# Patient Record
Sex: MALE | Race: WHITE | NOT HISPANIC OR LATINO | Employment: STUDENT | URBAN - METROPOLITAN AREA
[De-identification: names, ages, dates, MRNs, and addresses within clinical notes are randomized per-mention and may not be internally consistent; named-entity substitution may affect disease eponyms.]

---
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
## 2024-01-15 ENCOUNTER — PATIENT MESSAGE (OUTPATIENT)
Dept: PRIMARY CARE CLINIC | Facility: CLINIC | Age: 19
End: 2024-01-15
Payer: COMMERCIAL

## 2024-01-15 DIAGNOSIS — F90.2 ATTENTION DEFICIT HYPERACTIVITY DISORDER (ADHD), COMBINED TYPE: Primary | ICD-10-CM

## 2024-01-17 RX ORDER — DEXTROAMPHETAMINE SACCHARATE, AMPHETAMINE ASPARTATE MONOHYDRATE, DEXTROAMPHETAMINE SULFATE AND AMPHETAMINE SULFATE 5; 5; 5; 5 MG/1; MG/1; MG/1; MG/1
20 CAPSULE, EXTENDED RELEASE ORAL EVERY MORNING
Qty: 30 CAPSULE | Refills: 0 | Status: SHIPPED | OUTPATIENT
Start: 2024-01-17 | End: 2024-03-07 | Stop reason: SDUPTHER

## 2024-03-07 ENCOUNTER — TELEPHONE (OUTPATIENT)
Dept: PRIMARY CARE CLINIC | Facility: CLINIC | Age: 19
End: 2024-03-07
Payer: COMMERCIAL

## 2024-03-07 DIAGNOSIS — F90.2 ATTENTION DEFICIT HYPERACTIVITY DISORDER (ADHD), COMBINED TYPE: ICD-10-CM

## 2024-03-07 RX ORDER — DEXTROAMPHETAMINE SACCHARATE, AMPHETAMINE ASPARTATE MONOHYDRATE, DEXTROAMPHETAMINE SULFATE AND AMPHETAMINE SULFATE 5; 5; 5; 5 MG/1; MG/1; MG/1; MG/1
20 CAPSULE, EXTENDED RELEASE ORAL EVERY MORNING
Qty: 30 CAPSULE | Refills: 0 | Status: SHIPPED | OUTPATIENT
Start: 2024-03-07 | End: 2024-04-16 | Stop reason: SDUPTHER

## 2024-03-07 NOTE — TELEPHONE ENCOUNTER
----- Message from Molly Love LPN sent at 3/7/2024 12:08 PM CST -----  Contact: Maegan/Mom    ----- Message -----  From: Sherry Velasquez MA  Sent: 3/4/2024   3:48 PM CST  To: Molly Love LPN      ----- Message -----  From: Reta Thomas  Sent: 3/4/2024   2:48 PM CST  To: [facility]    Type:  Sooner Apoointment Request    Caller is requesting a sooner appointment. Caller will not accept being placed on the waitlist and is requesting a message be sent to doctor.  Name of Caller:Maegan  When is the first available appointment?6/18/24  Symptoms:follow up/labs  Would the patient rather a call back or a response via [facility]? call  Best Call Back Number:[phone]   Additional Information: Patient's mom reports patient had issues with vehicle and request to reschedule for a sooner date than next available.   Thank you,  [facility]

## 2024-03-13 ENCOUNTER — OFFICE VISIT (OUTPATIENT)
Dept: PRIMARY CARE CLINIC | Facility: CLINIC | Age: 19
End: 2024-03-13
Payer: COMMERCIAL

## 2024-03-13 VITALS
HEART RATE: 91 BPM | OXYGEN SATURATION: 97 % | TEMPERATURE: 99 F | DIASTOLIC BLOOD PRESSURE: 81 MMHG | WEIGHT: 210.31 LBS | SYSTOLIC BLOOD PRESSURE: 129 MMHG | RESPIRATION RATE: 18 BRPM | BODY MASS INDEX: 30.11 KG/M2 | HEIGHT: 70 IN

## 2024-03-13 DIAGNOSIS — Z11.4 SCREENING FOR HIV (HUMAN IMMUNODEFICIENCY VIRUS): ICD-10-CM

## 2024-03-13 DIAGNOSIS — Z13.29 THYROID DISORDER SCREEN: ICD-10-CM

## 2024-03-13 DIAGNOSIS — R35.0 URINE FREQUENCY: ICD-10-CM

## 2024-03-13 DIAGNOSIS — Z13.220 SCREENING CHOLESTEROL LEVEL: ICD-10-CM

## 2024-03-13 DIAGNOSIS — T78.40XD ALLERGY, SUBSEQUENT ENCOUNTER: Chronic | ICD-10-CM

## 2024-03-13 DIAGNOSIS — Z11.59 NEED FOR HEPATITIS C SCREENING TEST: ICD-10-CM

## 2024-03-13 DIAGNOSIS — F90.2 ATTENTION DEFICIT HYPERACTIVITY DISORDER (ADHD), COMBINED TYPE: ICD-10-CM

## 2024-03-13 DIAGNOSIS — Z00.00 ANNUAL PHYSICAL EXAM: Primary | ICD-10-CM

## 2024-03-13 DIAGNOSIS — J30.89 SEASONAL ALLERGIC RHINITIS DUE TO OTHER ALLERGIC TRIGGER: ICD-10-CM

## 2024-03-13 DIAGNOSIS — J30.2 SEASONAL ALLERGIES: ICD-10-CM

## 2024-03-13 PROCEDURE — 3008F BODY MASS INDEX DOCD: CPT | Mod: CPTII,S$GLB,, | Performed by: NURSE PRACTITIONER

## 2024-03-13 PROCEDURE — 1159F MED LIST DOCD IN RCRD: CPT | Mod: CPTII,S$GLB,, | Performed by: NURSE PRACTITIONER

## 2024-03-13 PROCEDURE — 3074F SYST BP LT 130 MM HG: CPT | Mod: CPTII,S$GLB,, | Performed by: NURSE PRACTITIONER

## 2024-03-13 PROCEDURE — 1160F RVW MEDS BY RX/DR IN RCRD: CPT | Mod: CPTII,S$GLB,, | Performed by: NURSE PRACTITIONER

## 2024-03-13 PROCEDURE — 99395 PREV VISIT EST AGE 18-39: CPT | Mod: S$GLB,,, | Performed by: NURSE PRACTITIONER

## 2024-03-13 PROCEDURE — 3079F DIAST BP 80-89 MM HG: CPT | Mod: CPTII,S$GLB,, | Performed by: NURSE PRACTITIONER

## 2024-03-13 RX ORDER — FLUTICASONE PROPIONATE 50 MCG
2 SPRAY, SUSPENSION (ML) NASAL DAILY
Qty: 16 G | Refills: 2 | Status: SHIPPED | OUTPATIENT
Start: 2024-03-13

## 2024-03-13 RX ORDER — AZITHROMYCIN 250 MG/1
TABLET, FILM COATED ORAL
Qty: 6 TABLET | Refills: 0 | Status: SHIPPED | OUTPATIENT
Start: 2024-03-13 | End: 2024-03-18

## 2024-03-13 NOTE — PROGRESS NOTES
Subjective:       Patient ID: Tano Yin is a 18 y.o. male.    Chief Complaint: Annual Exam    17 y/o male in for annual visit. Mr. Yin is still in school at [employer] [facility] [place].    Feels well today with his only concern is his allergies have been flared up worse lately for the past 3 days and he has some sinus congestion and drainage. Denies fever, cough or body aches.    ADHD - controlled with Adderall 20 mg XR and it is working well for him but he feels the medication wears off early in the day. He is tolerating the med well, no s/e of CP, palpitations, syncope. LA  checked and consistent with pt hx.     Allergies - chronic issue, previously seen by Dr. Bright due to him having food allergies. 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 Followed by Dr. Bright and is prescribed xyzal and flonase and they work well for him. Denies any recent allergy reactions. Has an EPIPEN for emergency reactions.     Still uses a nicotine vape 2-3 times per week. Denies drinking alcohol.                  Past Medical History:   Diagnosis Date    ADD (attention deficit disorder)        Past Surgical History:   Procedure Laterality Date    ULNAR COLLATERAL LIGAMENT RECONSTRUCTION Right 04/12/2021    Dr. Ogden       History reviewed. No pertinent family history.    Social History     Tobacco Use    Smoking status: Never    Smokeless tobacco: Never   Substance Use Topics    Alcohol use: Never    Drug use: Never       Patient Active Problem List   Diagnosis    Traumatic rupture of right ulnar collateral ligament    Attention deficit hyperactivity disorder (ADHD), combined type    Allergies    Seasonal allergies       Immunization History   Administered Date(s) Administered    DTaP 05/27/2005, 08/25/2006, 11/05/2009    DTaP / Hep B / IPV 2004, 02/14/2005    Hepatitis A, Pediatric/Adolescent, 2 Dose 12/07/2020    Hepatitis B, Pediatric/Adolescent 08/25/2006    HiB PRP-OMP 2004, 02/14/2005, 08/13/2007    IPV 02/14/2005, 08/25/2006, 11/13/2008     Influenza (Flumist) - Quadrivalent - Intranasal *Preferred* (2-49 years old) 10/14/2014    Influenza - Intranasal - Trivalent 01/25/2013    Influenza - Quadrivalent - PF *Preferred* (6 months and older) 12/07/2020    Influenza - Trivalent - PF (ADULT) 11/04/2010    MMR 05/09/2006, 11/13/2008    Meningococcal B, recombinant 12/07/2020    Meningococcal Conjugate (MCV4P) 10/19/2017, 12/07/2020    Pneumococcal Conjugate - 7 Valent 02/14/2005, 05/27/2005, 08/25/2006, 08/13/2007    Tdap 08/17/2015    Varicella 08/25/2006, 11/13/2008           Review of Systems   Constitutional:  Negative for activity change, appetite change, chills, diaphoresis, fatigue and fever.   HENT:  Positive for congestion, postnasal drip, rhinorrhea, sinus pressure and sinus pain. Negative for ear discharge, ear pain, sneezing, sore throat, tinnitus and trouble swallowing.    Eyes:  Negative for pain, discharge, redness, itching and visual disturbance.   Respiratory:  Negative for cough, chest tightness, shortness of breath and wheezing.    Cardiovascular:  Negative for chest pain, palpitations and leg swelling.   Gastrointestinal:  Negative for abdominal distention, abdominal pain, blood in stool, constipation, diarrhea, nausea and vomiting.   Endocrine: Negative for cold intolerance, heat intolerance, polydipsia, polyphagia and polyuria.   Genitourinary:  Negative for difficulty urinating, dysuria, frequency, hematuria and urgency.   Musculoskeletal:  Negative for back pain, gait problem and neck pain.   Skin:  Negative for color change and rash.   Allergic/Immunologic: Positive for environmental allergies and food allergies.   Neurological:  Negative for dizziness, speech difficulty, weakness, light-headedness, numbness and headaches.   Hematological:  Negative for adenopathy. Does not bruise/bleed easily.   Psychiatric/Behavioral:  Negative for behavioral problems, confusion and dysphoric mood. The patient is not nervous/anxious.   "    Objective:     Vitals:    03/13/24 0921   BP: 129/81   BP Location: Right arm   Patient Position: Sitting   BP Method: Large (Automatic)   Pulse: 91   Resp: 18   Temp: 99.3 °F (37.4 °C)   TempSrc: Oral   SpO2: 97%   Weight: 95.4 kg (210 lb 4.8 oz)   Height: 5' 10" (1.778 m)       Physical Exam  Vitals and nursing note reviewed.   Constitutional:       General: He is not in acute distress.     Appearance: Normal appearance. He is not diaphoretic.   HENT:      Head: Normocephalic and atraumatic.      Nose: Congestion and rhinorrhea present.      Mouth/Throat:      Mouth: Mucous membranes are moist.      Pharynx: Oropharynx is clear. No oropharyngeal exudate or posterior oropharyngeal erythema.   Eyes:      General:         Right eye: No discharge.         Left eye: No discharge.      Extraocular Movements: Extraocular movements intact.      Conjunctiva/sclera: Conjunctivae normal.      Pupils: Pupils are equal, round, and reactive to light.   Neck:      Thyroid: No thyromegaly or thyroid tenderness.   Cardiovascular:      Rate and Rhythm: Normal rate and regular rhythm.      Pulses: Normal pulses.      Heart sounds: Normal heart sounds.   Pulmonary:      Effort: Pulmonary effort is normal. No tachypnea or bradypnea.      Breath sounds: Normal breath sounds. No stridor. No wheezing or rales.   Abdominal:      General: Bowel sounds are normal. There is no distension.      Palpations: Abdomen is soft.      Tenderness: There is no abdominal tenderness.   Musculoskeletal:         General: No tenderness. Normal range of motion.      Cervical back: Normal range of motion and neck supple.      Right lower leg: No edema.      Left lower leg: No edema.   Lymphadenopathy:      Cervical: No cervical adenopathy.   Skin:     General: Skin is warm and dry.      Capillary Refill: Capillary refill takes less than 2 seconds.      Findings: No rash.   Neurological:      General: No focal deficit present.      Mental Status: He is " alert and oriented to person, place, and time.   Psychiatric:         Mood and Affect: Mood and affect normal.         Behavior: Behavior normal. Behavior is cooperative.         Thought Content: Thought content normal.         Judgment: Judgment normal.         No visits with results within 6 Month(s) from this visit.   Latest known visit with results is:   Office Visit on 08/11/2021   Component Date Value Ref Range Status    WBC 08/16/2021 7.3  4.5 - 13.0 Thousand/uL Final    RBC 08/16/2021 5.10  4.10 - 5.70 Million/uL Final    Hemoglobin 08/16/2021 15.4  12.0 - 16.9 g/dL Final    Hematocrit 08/16/2021 44.6  36.0 - 49.0 % Final    MCV 08/16/2021 87.5  78.0 - 98.0 fL Final    MCH 08/16/2021 30.2  25.0 - 35.0 pg Final    MCHC 08/16/2021 34.5  31.0 - 36.0 g/dL Final    RDW 08/16/2021 11.8  11.0 - 15.0 % Final    Platelets 08/16/2021 316  140 - 400 Thousand/uL Final    MPV 08/16/2021 9.9  7.5 - 12.5 fL Final    Neutrophils, Abs 08/16/2021 4,723  1,800 - 8,000 cells/uL Final    Lymph # 08/16/2021 1,577  1,200 - 5,200 cells/uL Final    Mono # 08/16/2021 540  200 - 900 cells/uL Final    Eos # 08/16/2021 402  15 - 500 cells/uL Final    Baso # 08/16/2021 58  0 - 200 cells/uL Final    Neutrophils Relative 08/16/2021 64.7  % Final    Lymph % 08/16/2021 21.6  % Final    Mono % 08/16/2021 7.4  % Final    Eosinophil % 08/16/2021 5.5  % Final    Basophil % 08/16/2021 0.8  % Final    Glucose 08/16/2021 95  65 - 99 mg/dL Final    BUN 08/16/2021 19  7 - 20 mg/dL Final    Creatinine 08/16/2021 1.64 (H)  0.60 - 1.20 mg/dL Final    BUN/Creatinine Ratio 08/16/2021 12  6 - 22 (calc) Final    Sodium 08/16/2021 139  135 - 146 mmol/L Final    Potassium 08/16/2021 4.4  3.8 - 5.1 mmol/L Final    Chloride 08/16/2021 100  98 - 110 mmol/L Final    CO2 08/16/2021 30  20 - 32 mmol/L Final    Calcium 08/16/2021 10.1  8.9 - 10.4 mg/dL Final    Total Protein 08/16/2021 7.4  6.3 - 8.2 g/dL Final    Albumin 08/16/2021 4.9  3.6 - 5.1 g/dL Final     Globulin, Total 08/16/2021 2.5  2.1 - 3.5 g/dL (calc) Final    Albumin/Globulin Ratio 08/16/2021 2.0  1.0 - 2.5 (calc) Final    Total Bilirubin 08/16/2021 0.6  0.2 - 1.1 mg/dL Final    Alkaline Phosphatase 08/16/2021 81  46 - 169 U/L Final    AST 08/16/2021 15  12 - 32 U/L Final    ALT 08/16/2021 15  8 - 46 U/L Final    Cholesterol 08/16/2021 125  <170 mg/dL Final    HDL 08/16/2021 42 (L)  >45 mg/dL Final    Triglycerides 08/16/2021 120 (H)  <90 mg/dL Final    LDL Cholesterol 08/16/2021 62  <110 mg/dL (calc) Final    HDL/Cholesterol Ratio 08/16/2021 3.0  <5.0 (calc) Final    Non HDL Chol. (LDL+VLDL) 08/16/2021 83  <120 mg/dL (calc) Final    TSH 08/16/2021 1.23  0.50 - 4.30 mIU/L Final         Assessment:      1. Annual physical exam    2. Attention deficit hyperactivity disorder (ADHD), combined type Well controlled   3. Allergy, subsequent encounter    4. Seasonal allergies    5. Seasonal allergic rhinitis due to other allergic trigger    6. Urine frequency    7. Screening for HIV (human immunodeficiency virus)    8. Need for hepatitis C screening test    9. Screening cholesterol level    10. Thyroid disorder screen          Plan:     Annual physical exam  Comments:  Will review labs and determine POC based on results.  Orders:  -     CBC Auto Differential; Future; Expected date: 03/13/2024  -     Comprehensive Metabolic Panel; Future; Expected date: 03/13/2024  -     Lipid Panel; Future; Expected date: 03/13/2024  -     TSH; Future; Expected date: 03/13/2024    Attention deficit hyperactivity disorder (ADHD), combined type  Comments:  Continue Adderall daily, F/U in 3 months  Orders:  -     Toxicology screen, urine; Future; Expected date: 03/13/2024    Allergy, subsequent encounter  Comments:  avoid allergens, use EPI PEN as needed    Seasonal allergies  Comments:  continue using medications as directed, reduce exposure to allergens  Orders:  -     fluticasone propionate (FLONASE) 50 mcg/actuation nasal spray; 2  sprays (100 mcg total) by Each Nostril route Daily.  Dispense: 16 g; Refill: 2    Seasonal allergic rhinitis due to other allergic trigger  -     azithromycin (Z-BIRGIT) 250 MG tablet; Take 2 tablets by mouth on day 1; Take 1 tablet by mouth on days 2-5  Dispense: 6 tablet; Refill: 0    Urine frequency  -     Urinalysis, Reflex to Urine Culture Urine, Clean Catch; Future; Expected date: 03/13/2024    Screening for HIV (human immunodeficiency virus)  -     HIV 1/2 Ag/Ab (4th Gen); Future; Expected date: 03/13/2024    Need for hepatitis C screening test  -     Hepatitis C Antibody; Future; Expected date: 03/13/2024    Screening cholesterol level  -     Lipid Panel; Future; Expected date: 03/13/2024    Thyroid disorder screen  -     TSH; Future; Expected date: 03/13/2024         Current Outpatient Medications   Medication Sig Dispense Refill    dextroamphetamine-amphetamine (ADDERALL XR) 20 MG 24 hr capsule Take 1 capsule (20 mg total) by mouth every morning. 30 capsule 0    EPINEPHrine (EPIPEN JR) 0.15 mg/0.3 mL pen injection INJECT INTO THE MIDDLE OF THE OUTER THIGH AND HOLD FOR 3 SECONDS AS NEEDED FOR SEVERE ALLERGIC REACTION THEN CALL 911 IF USED. 2 each 0    levocetirizine (XYZAL) 5 MG tablet Take 1 tablet (5 mg total) by mouth every evening. For sinus drainage 30 tablet 11    azithromycin (Z-BIRGIT) 250 MG tablet Take 2 tablets by mouth on day 1; Take 1 tablet by mouth on days 2-5 6 tablet 0    fluticasone propionate (FLONASE) 50 mcg/actuation nasal spray 2 sprays (100 mcg total) by Each Nostril route Daily. 16 g 2     No current facility-administered medications for this visit.       Medications Discontinued During This Encounter   Medication Reason    fluticasone propionate (FLONASE) 50 mcg/actuation nasal spray Reorder       Health Maintenance   Topic Date Due    Hepatitis C Screening  Never done    HPV Vaccines (1 - Male 2-dose series) Never done    TETANUS VACCINE  08/17/2025    Lipid Panel  Completed        Patient Instructions   RTC in 3 months for F/U or sooner if needed.    Patient Education       Yearly Physical for Adults   About this topic   Most people do not want to be sick. Having a checkup each year with your doctor is one way to help you stay healthy. You may need to see your doctor more or less often. How often you need to go to the doctor depends on your age. Your family and medical history also play a role in how often you need to go to the doctor. Going to see your doctor on a routine basis can help you find problems early or even before they start. This may make it easier to treat or cure your problem.  General   Your doctor will talk about many things during your checkup. Your doctor may ask about:  Your medical and family history.  All the drugs you are taking. Be sure to include all prescription, over the counter, and herbal supplements. Tell the doctor if you have any drug allergy. Bring a list of drugs you take with you.  How you are feeling and if you are having any problems.  Risky behaviors like smoking, drinking alcohol, using illegal drugs, not wearing seatbelts, having unprotected sex, etc.  Your doctor will do a physical exam and may check your:  Height and weight  Blood pressure  Reflexes  Memory  Vision  Hearing  Your doctor may order:  Lab tests  ECG to check your heart rhythm  X-rays  Tests or treatments based on your exam  What lifestyle changes are needed?   Your doctor may suggest you make changes to your lifestyle at this visit. The doctor may talk with you about being more active or lowering stress levels. Ask your doctor what you need to do.  What drugs may be needed?   Your doctor may order drugs or vaccines to protect you from illnesses.  What changes to diet are needed?   Talk to your doctor to see if any changes are needed to your diet.  When do I need to call the doctor?   Call your doctor if you need to learn about any test results. Together you can make a plan for more  care.  Helpful tips   Make a list of questions for your doctor before you go. This will help you remember to ask about any concerns. Write down any answers from your doctor so you can look over them after your visit.   Tell your doctor about any changes in your body or health since your last visit.  Ask your doctor about any screening tests you need.  Where can I learn more?   American Academy of [employer]icians  http://familydoctor.org/familydoctor/en/prevention-wellness/staying-healthy/healthy-living/preventive-services-for-healthy-living.printerview.html   Centers for Disease Control  http://www.cdc.gov/family/checkup/   Last Reviewed Date   2019-04-22  Consumer Information Use and Disclaimer   This information is not specific medical advice and does not replace information you receive from your health care provider. This is only a brief summary of general information. It does NOT include all information about conditions, illnesses, injuries, tests, procedures, treatments, therapies, discharge instructions or life-style choices that may apply to you. You must talk with your health care provider for complete information about your health and treatment options. This information should not be used to decide whether or not to accept your health care providers advice, instructions or recommendations. Only your health care provider has the knowledge and training to provide advice that is right for you.  Copyright   Copyright © 2021 UpToDate, Inc. and its affiliates and/or licensors. All rights reserved.    Patient Education       Attention Deficit Hyperactivity Disorder (ADHD) Discharge Instructions   About this topic   Attention deficit hyperactivity disorder is also called ADHD or ADD. Most often, this starts at a young age. This disorder makes it hard to focus or sit still. People with ADHD may find it hard to make good decisions. Children with ADHD may have trouble in school and at home. Adults may have problems  at work. People with ADHD may also have a problem getting along well with others. While there is no cure for ADHD, you and your doctor can work on a plan that is best for you to treat the signs of ADHD.  What care is needed at home?   Ask your doctor what you need to do when you go home. Make sure you ask questions if you do not understand what the doctor says. This way you will know what you need to do.  Try to get enough sleep at night. Most often adults need 7 to 8 hours each night and children need 8 to 10 hours each night. Rest during the day if you are tired.  Eat and sleep at the same times every day.  Have a plan for where to keep things in your home. Use checklists, things to help you remember, and alarms. A list of things you may need to find in a hurry can be helpful. These can help you put things in the right place and manage your time.  Work on getting better at reading and taking notes.  Have a space that is just for work or homework so you will be able to pay attention. This may be an office or a desk facing a wall. Try using headphones so you cannot hear the other noises.  Do one thing at a time. Keep a list of the next things you were planning to do or say.  Break large jobs or things you have to do into smaller jobs. This will make them easier to finish. Reward yourself along the way.  Have rules that are clear and easy to follow.   Look at where you are the strongest. Give rewards for good behavior, finished schoolwork, or for doing a good job at work.  Do not do too many things that might cause stress.  What follow-up care is needed?   Your doctor may ask you to make visits to the office to check on your progress. Be sure to keep these visits.  Your doctor may send you to a special mental health doctor. This person will talk with you about the problems you are having. Then, you can work together to find ways to help you manage them.  Your doctor may suggest you try talk therapy to help you live  well with your ADHD.  What drugs may be needed?   The doctor may order drugs to:  Help lower your worry  Help you to pay attention  Help you be more calm  Help you be less impulsive  Help keep things in your life balanced  Care for low mood  Will physical activity be limited?   Physical activity will help keep your mind and body in good shape. Talk with your doctor about the right amount of activity for you.  What problems could happen?   Feeling badly about yourself  Raise the chances of you hurting yourself, such as injury in a car accident  Not do well in school and work  Trouble making friends or getting along well with others  Raise your chance to abuse alcohol or drugs  What can be done to prevent this health problem?   The cause of ADHD is not clear, but to lower the chance of your child having ADHD:  Do not drink beer, wine, or mixed drinks (alcohol) while you are pregnant.  Do not smoke or use illegal drugs while you are pregnant.  Keep your child away from things like harmful toxins and chemicals.  Keep your child from having too much time in front of computers, TV, and video games.  Get plenty of exercise.  Be more aware of thoughts, emotions, and experiences each moment. This is mindfulness.  When do I need to call the doctor?   Drugs are not working  Symptoms are getting worse  Teach Back: Helping You Understand   The Teach Back Method helps you understand the information we are giving you. After you talk with the staff, tell them in your own words what you learned. This helps to make sure the staff has described each thing clearly. It also helps to explain things that may have been confusing. Before going home, make sure you can do these:  I can tell you about my condition.  I can tell you ways to help me pay attention.  I can tell you what I will do if I think my drugs are not working.  Where can I learn more?    HelpGuide.org  http://www.helpguide.org/articles/add-adhd/attention-deficit-disorder-adhd-parenting-tips.htm   KidsHealth  http://kidshealth.org/parent/medical/learning/adhd.html   National Butler of Mental Health  http://www.nim.nih.gov/health/topics/attention-deficit-hyperactivity-disorder-adhd/index.shtml   Last Reviewed Date   2020-06-14  Consumer Information Use and Disclaimer   This information is not specific medical advice and does not replace information you receive from your health care provider. This is only a brief summary of general information. It does NOT include all information about conditions, illnesses, injuries, tests, procedures, treatments, therapies, discharge instructions or life-style choices that may apply to you. You must talk with your health care provider for complete information about your health and treatment options. This information should not be used to decide whether or not to accept your health care providers advice, instructions or recommendations. Only your health care provider has the knowledge and training to provide advice that is right for you.  Copyright   Copyright © 2021 UpToDate, Inc. and its affiliates and/or licensors. All rights reserved.    Patient Education       Seasonal Allergies Discharge Instructions   About this topic   Seasonal allergies are also called allergic rhinitis or hay fever. You may have sneezing; a stuffy or runny nose; or itchy throat, ears, or eyes. You may feel very tired. Most often, this problem is caused by:  Pollens from trees, grasses, or weeds.  Mold spores that grow when the air is humid, wet, or damp.  Some people can breathe in these things and have no problems. But when you have a seasonal allergy, your body acts as if the substance is harming you. Then you have symptoms. You may have symptoms only at some times of the year. If your symptoms last all year, they may be caused by:  Insects, such as dust mites or cock  roaches.  Animals, such as cats or dogs.  Mold spores.     What care is needed at home?   Ask your doctor what you need to do when you go home. Make sure you ask questions if you do not understand what the doctor says.  Rinse your nose with salt water to get rid of pollen inside of your nose.  Keep the windows closed and use air conditioning.  Shower before bed to rinse pollen from your hair and skin.  Wear a dust mask if you need to be outside.  Use over-the-counter medicines to help with your symptoms:  A steroid nose spray can help with a stuffy nose. It can also help with drainage down the back of your throat.  An antihistamine can help with itching, sneezing, or runny nose.  An antihistamine eye drop can help with itchy eyes.  A decongestant can help with a stuffy nose. Talk with your doctor or pharmacist about your other health problems before you use this kind of medicine. It may not be safe for people with high blood pressure.  What follow-up care is needed?   Your doctor may ask you to make visits to the office to check on your progress. Your doctor may ask you to see an allergy specialist, or to have testing done to learn what is causing your allergies. Be sure to keep these visits.  What drugs may be needed?   The doctor may order drugs to treat the signs. Take your drugs as ordered. You may also take allergy shots if you have had allergy tests.  Will physical activity be limited?   You may have to limit your activity. If your health problem is caused by an allergy to pollens or molds, you may want to limit your time outdoors. Talk to your doctor about what activities are best for you.  What problems could happen?   Your signs may not get better with your drugs  Asthma may get worse  Sinus infection  What can be done to prevent this health problem?   Try to avoid allergens.  If you are allergic to pollens, grasses, trees, etc:  Stay inside in the morning when pollen counts are high.  Avoid going outside  when the trees, grasses, or weeds are blooming.  Keep the windows of your house and car closed.  Use an air conditioner.  If you are allergic to dust, molds, dust mites, pets, etc:  Clean your air conditioner or furnace filters regularly.  Cover pillows and mattresses with vinyl covers to lower your exposure to dust mites.  Wash bedding weekly in very hot water.  Use fewer dust-collecting items, such as curtains, bed skirts, carpeting, and stuffed animals, mainly in your bedroom.  If you can't avoid having a furry pet, vacuum often and keep your pet out of bedrooms and other rooms with carpets.  When do I need to call the doctor?   You are having so much trouble breathing that you can only say one or two words at a time.  You need to sit upright at all times to be able to breathe and or cannot lie down.  You have severe swelling of your tongue, lips, or mouth.  You have trouble breathing when talking or sitting still.  You have a fever of 100.4°F (38°C) or higher.  You have green or yellow mucus.  Teach Back: Helping You Understand   The Teach Back Method helps you understand the information we are giving you. After you talk with the staff, tell them in your own words what you learned. This helps to make sure the staff has described each thing clearly. It also helps to explain things that may have been confusing. Before going home, make sure you can do these:  I can tell you about my condition.  I can tell you what may help make the air .  I can tell you what may help ease my allergies.  Where can I learn more?   Food and Drug Administration  https://www.fda.gov/ForConsumers/ConsumerUpdates/fsx195932.htm   NHS Choices  https://www.nhs.uk/conditions/Hay-fever/   Last Reviewed Date   2021-06-21  Consumer Information Use and Disclaimer   This information is not specific medical advice and does not replace information you receive from your health care provider. This is only a brief summary of general information.  It does NOT include all information about conditions, illnesses, injuries, tests, procedures, treatments, therapies, discharge instructions or life-style choices that may apply to you. You must talk with your health care provider for complete information about your health and treatment options. This information should not be used to decide whether or not to accept your health care providers advice, instructions or recommendations. Only your health care provider has the knowledge and training to provide advice that is right for you.  Copyright   Copyright © 2021 [employer] and its affiliates and/or licensors. All rights reserved.          Risks, benefits, and alternatives discussed with patient, Patient verbalized understanding of discussed plan of care. Asked patient if any further questions, answered no.    Future Appointments   Date Time Provider Department Center   6/25/2024  3:40 PM Aster Whitten NP [facility] [id number] Mateo Whitten NP

## 2024-03-13 NOTE — PATIENT INSTRUCTIONS
RTC in 3 months for F/U or sooner if needed.    Patient Education       Yearly Physical for Adults   About this topic   Most people do not want to be sick. Having a checkup each year with your doctor is one way to help you stay healthy. You may need to see your doctor more or less often. How often you need to go to the doctor depends on your age. Your family and medical history also play a role in how often you need to go to the doctor. Going to see your doctor on a routine basis can help you find problems early or even before they start. This may make it easier to treat or cure your problem.  General   Your doctor will talk about many things during your checkup. Your doctor may ask about:  Your medical and family history.  All the drugs you are taking. Be sure to include all prescription, over the counter, and herbal supplements. Tell the doctor if you have any drug allergy. Bring a list of drugs you take with you.  How you are feeling and if you are having any problems.  Risky behaviors like smoking, drinking alcohol, using illegal drugs, not wearing seatbelts, having unprotected sex, etc.  Your doctor will do a physical exam and may check your:  Height and weight  Blood pressure  Reflexes  Memory  Vision  Hearing  Your doctor may order:  Lab tests  ECG to check your heart rhythm  X-rays  Tests or treatments based on your exam  What lifestyle changes are needed?   Your doctor may suggest you make changes to your lifestyle at this visit. The doctor may talk with you about being more active or lowering stress levels. Ask your doctor what you need to do.  What drugs may be needed?   Your doctor may order drugs or vaccines to protect you from illnesses.  What changes to diet are needed?   Talk to your doctor to see if any changes are needed to your diet.  When do I need to call the doctor?   Call your doctor if you need to learn about any test results. Together you can make a plan for more care.  Helpful tips   Make a  list of questions for your doctor before you go. This will help you remember to ask about any concerns. Write down any answers from your doctor so you can look over them after your visit.   Tell your doctor about any changes in your body or health since your last visit.  Ask your doctor about any screening tests you need.  Where can I learn more?   American Academy of [employer]icians  http://familydoctor.org/familydoctor/en/prevention-wellness/staying-healthy/healthy-living/preventive-services-for-healthy-living.printerview.html   Centers for Disease Control  http://www.cdc.gov/family/checkup/   Last Reviewed Date   2019-04-22  Consumer Information Use and Disclaimer   This information is not specific medical advice and does not replace information you receive from your health care provider. This is only a brief summary of general information. It does NOT include all information about conditions, illnesses, injuries, tests, procedures, treatments, therapies, discharge instructions or life-style choices that may apply to you. You must talk with your health care provider for complete information about your health and treatment options. This information should not be used to decide whether or not to accept your health care providers advice, instructions or recommendations. Only your health care provider has the knowledge and training to provide advice that is right for you.  Copyright   Copyright © 2021 [employer] and its affiliates and/or licensors. All rights reserved.    Patient Education       Attention Deficit Hyperactivity Disorder (ADHD) Discharge Instructions   About this topic   Attention deficit hyperactivity disorder is also called ADHD or ADD. Most often, this starts at a young age. This disorder makes it hard to focus or sit still. People with ADHD may find it hard to make good decisions. Children with ADHD may have trouble in school and at home. Adults may have problems at work. People with ADHD  may also have a problem getting along well with others. While there is no cure for ADHD, you and your doctor can work on a plan that is best for you to treat the signs of ADHD.  What care is needed at home?   Ask your doctor what you need to do when you go home. Make sure you ask questions if you do not understand what the doctor says. This way you will know what you need to do.  Try to get enough sleep at night. Most often adults need 7 to 8 hours each night and children need 8 to 10 hours each night. Rest during the day if you are tired.  Eat and sleep at the same times every day.  Have a plan for where to keep things in your home. Use checklists, things to help you remember, and alarms. A list of things you may need to find in a hurry can be helpful. These can help you put things in the right place and manage your time.  Work on getting better at reading and taking notes.  Have a space that is just for work or homework so you will be able to pay attention. This may be an office or a desk facing a wall. Try using headphones so you cannot hear the other noises.  Do one thing at a time. Keep a list of the next things you were planning to do or say.  Break large jobs or things you have to do into smaller jobs. This will make them easier to finish. Reward yourself along the way.  Have rules that are clear and easy to follow.   Look at where you are the strongest. Give rewards for good behavior, finished schoolwork, or for doing a good job at work.  Do not do too many things that might cause stress.  What follow-up care is needed?   Your doctor may ask you to make visits to the office to check on your progress. Be sure to keep these visits.  Your doctor may send you to a special mental health doctor. This person will talk with you about the problems you are having. Then, you can work together to find ways to help you manage them.  Your doctor may suggest you try talk therapy to help you live well with your ADHD.  What  drugs may be needed?   The doctor may order drugs to:  Help lower your worry  Help you to pay attention  Help you be more calm  Help you be less impulsive  Help keep things in your life balanced  Care for low mood  Will physical activity be limited?   Physical activity will help keep your mind and body in good shape. Talk with your doctor about the right amount of activity for you.  What problems could happen?   Feeling badly about yourself  Raise the chances of you hurting yourself, such as injury in a car accident  Not do well in school and work  Trouble making friends or getting along well with others  Raise your chance to abuse alcohol or drugs  What can be done to prevent this health problem?   The cause of ADHD is not clear, but to lower the chance of your child having ADHD:  Do not drink beer, wine, or mixed drinks (alcohol) while you are pregnant.  Do not smoke or use illegal drugs while you are pregnant.  Keep your child away from things like harmful toxins and chemicals.  Keep your child from having too much time in front of computers, TV, and video games.  Get plenty of exercise.  Be more aware of thoughts, emotions, and experiences each moment. This is mindfulness.  When do I need to call the doctor?   Drugs are not working  Symptoms are getting worse  Teach Back: Helping You Understand   The Teach Back Method helps you understand the information we are giving you. After you talk with the staff, tell them in your own words what you learned. This helps to make sure the staff has described each thing clearly. It also helps to explain things that may have been confusing. Before going home, make sure you can do these:  I can tell you about my condition.  I can tell you ways to help me pay attention.  I can tell you what I will do if I think my drugs are not working.  Where can I learn more?   HelpGuide.org  http://www.helpguide.org/articles/add-adhd/attention-deficit-disorder-adhd-parenting-tips.htm    KidsHealth  http://kidshealth.org/parent/medical/learning/adhd.html   [employer] [place] [employer]  http://www.nimh.nih.gov/health/topics/attention-deficit-hyperactivity-disorder-adhd/index.shtml   Last Reviewed Date   2020-06-14  Consumer Information Use and Disclaimer   This information is not specific medical advice and does not replace information you receive from your health care provider. This is only a brief summary of general information. It does NOT include all information about conditions, illnesses, injuries, tests, procedures, treatments, therapies, discharge instructions or life-style choices that may apply to you. You must talk with your health care provider for complete information about your health and treatment options. This information should not be used to decide whether or not to accept your health care providers advice, instructions or recommendations. Only your health care provider has the knowledge and training to provide advice that is right for you.  Copyright   Copyright © 2021 [employer], [employer]. and its affiliates and/or licensors. All rights reserved.    Patient Education       Seasonal Allergies Discharge Instructions   About this topic   Seasonal allergies are also called allergic rhinitis or hay fever. You may have sneezing; a stuffy or runny nose; or itchy throat, ears, or eyes. You may feel very tired. Most often, this problem is caused by:  Pollens from trees, grasses, or weeds.  Mold spores that grow when the air is humid, wet, or damp.  Some people can breathe in these things and have no problems. But when you have a seasonal allergy, your body acts as if the substance is harming you. Then you have symptoms. You may have symptoms only at some times of the year. If your symptoms last all year, they may be caused by:  Insects, such as dust mites or cock roaches.  Animals, such as cats or dogs.  Mold spores.     What care is needed at home?   Ask your doctor what you need to do  when you go home. Make sure you ask questions if you do not understand what the doctor says.  Rinse your nose with salt water to get rid of pollen inside of your nose.  Keep the windows closed and use air conditioning.  Shower before bed to rinse pollen from your hair and skin.  Wear a dust mask if you need to be outside.  Use over-the-counter medicines to help with your symptoms:  A steroid nose spray can help with a stuffy nose. It can also help with drainage down the back of your throat.  An antihistamine can help with itching, sneezing, or runny nose.  An antihistamine eye drop can help with itchy eyes.  A decongestant can help with a stuffy nose. Talk with your doctor or pharmacist about your other health problems before you use this kind of medicine. It may not be safe for people with high blood pressure.  What follow-up care is needed?   Your doctor may ask you to make visits to the office to check on your progress. Your doctor may ask you to see an allergy specialist, or to have testing done to learn what is causing your allergies. Be sure to keep these visits.  What drugs may be needed?   The doctor may order drugs to treat the signs. Take your drugs as ordered. You may also take allergy shots if you have had allergy tests.  Will physical activity be limited?   You may have to limit your activity. If your health problem is caused by an allergy to pollens or molds, you may want to limit your time outdoors. Talk to your doctor about what activities are best for you.  What problems could happen?   Your signs may not get better with your drugs  Asthma may get worse  Sinus infection  What can be done to prevent this health problem?   Try to avoid allergens.  If you are allergic to pollens, grasses, trees, etc:  Stay inside in the morning when pollen counts are high.  Avoid going outside when the trees, grasses, or weeds are blooming.  Keep the windows of your house and car closed.  Use an air conditioner.  If  you are allergic to dust, molds, dust mites, pets, etc:  Clean your air conditioner or furnace filters regularly.  Cover pillows and mattresses with vinyl covers to lower your exposure to dust mites.  Wash bedding weekly in very hot water.  Use fewer dust-collecting items, such as curtains, bed skirts, carpeting, and stuffed animals, mainly in your bedroom.  If you can't avoid having a furry pet, vacuum often and keep your pet out of bedrooms and other rooms with carpets.  When do I need to call the doctor?   You are having so much trouble breathing that you can only say one or two words at a time.  You need to sit upright at all times to be able to breathe and or cannot lie down.  You have severe swelling of your tongue, lips, or mouth.  You have trouble breathing when talking or sitting still.  You have a fever of 100.4°F (38°C) or higher.  You have green or yellow mucus.  Teach Back: Helping You Understand   The Teach Back Method helps you understand the information we are giving you. After you talk with the staff, tell them in your own words what you learned. This helps to make sure the staff has described each thing clearly. It also helps to explain things that may have been confusing. Before going home, make sure you can do these:  I can tell you about my condition.  I can tell you what may help make the air .  I can tell you what may help ease my allergies.  Where can I learn more?   Food and Drug Administration  https://www.fda.gov/ForConsumers/ConsumerUpdates/dhe942149.htm   NHS Choices  https://www.nhs.uk/conditions/Hay-fever/   Last Reviewed Date   2021-06-21  Consumer Information Use and Disclaimer   This information is not specific medical advice and does not replace information you receive from your health care provider. This is only a brief summary of general information. It does NOT include all information about conditions, illnesses, injuries, tests, procedures, treatments, therapies,  discharge instructions or life-style choices that may apply to you. You must talk with your health care provider for complete information about your health and treatment options. This information should not be used to decide whether or not to accept your health care providers advice, instructions or recommendations. Only your health care provider has the knowledge and training to provide advice that is right for you.  Copyright   Copyright © 2021 [employer] and its affiliates and/or licensors. All rights reserved.

## 2024-04-16 DIAGNOSIS — F90.2 ATTENTION DEFICIT HYPERACTIVITY DISORDER (ADHD), COMBINED TYPE: ICD-10-CM

## 2024-04-16 RX ORDER — DEXTROAMPHETAMINE SACCHARATE, AMPHETAMINE ASPARTATE MONOHYDRATE, DEXTROAMPHETAMINE SULFATE AND AMPHETAMINE SULFATE 5; 5; 5; 5 MG/1; MG/1; MG/1; MG/1
20 CAPSULE, EXTENDED RELEASE ORAL EVERY MORNING
Qty: 30 CAPSULE | Refills: 0 | Status: SHIPPED | OUTPATIENT
Start: 2024-04-16 | End: 2024-05-28 | Stop reason: SDUPTHER

## 2024-05-28 ENCOUNTER — PATIENT MESSAGE (OUTPATIENT)
Dept: PRIMARY CARE CLINIC | Facility: CLINIC | Age: 19
End: 2024-05-28
Payer: COMMERCIAL

## 2024-05-28 DIAGNOSIS — F90.2 ATTENTION DEFICIT HYPERACTIVITY DISORDER (ADHD), COMBINED TYPE: ICD-10-CM

## 2024-05-28 RX ORDER — DEXTROAMPHETAMINE SACCHARATE, AMPHETAMINE ASPARTATE MONOHYDRATE, DEXTROAMPHETAMINE SULFATE AND AMPHETAMINE SULFATE 5; 5; 5; 5 MG/1; MG/1; MG/1; MG/1
20 CAPSULE, EXTENDED RELEASE ORAL EVERY MORNING
Qty: 30 CAPSULE | Refills: 0 | Status: SHIPPED | OUTPATIENT
Start: 2024-05-28

## 2024-06-25 ENCOUNTER — OFFICE VISIT (OUTPATIENT)
Dept: PRIMARY CARE CLINIC | Facility: CLINIC | Age: 19
End: 2024-06-25
Payer: COMMERCIAL

## 2024-06-25 VITALS
SYSTOLIC BLOOD PRESSURE: 126 MMHG | HEIGHT: 70 IN | RESPIRATION RATE: 18 BRPM | TEMPERATURE: 98 F | HEART RATE: 90 BPM | BODY MASS INDEX: 28.65 KG/M2 | DIASTOLIC BLOOD PRESSURE: 74 MMHG | WEIGHT: 200.13 LBS | OXYGEN SATURATION: 97 %

## 2024-06-25 DIAGNOSIS — F90.2 ATTENTION DEFICIT HYPERACTIVITY DISORDER (ADHD), COMBINED TYPE: Primary | ICD-10-CM

## 2024-06-25 DIAGNOSIS — J30.2 SEASONAL ALLERGIES: Chronic | ICD-10-CM

## 2024-06-25 PROCEDURE — 3074F SYST BP LT 130 MM HG: CPT | Mod: CPTII,S$GLB,, | Performed by: NURSE PRACTITIONER

## 2024-06-25 PROCEDURE — 99214 OFFICE O/P EST MOD 30 MIN: CPT | Mod: S$GLB,,, | Performed by: NURSE PRACTITIONER

## 2024-06-25 PROCEDURE — 3078F DIAST BP <80 MM HG: CPT | Mod: CPTII,S$GLB,, | Performed by: NURSE PRACTITIONER

## 2024-06-25 PROCEDURE — 3008F BODY MASS INDEX DOCD: CPT | Mod: CPTII,S$GLB,, | Performed by: NURSE PRACTITIONER

## 2024-06-25 PROCEDURE — 1159F MED LIST DOCD IN RCRD: CPT | Mod: CPTII,S$GLB,, | Performed by: NURSE PRACTITIONER

## 2024-06-25 PROCEDURE — 1160F RVW MEDS BY RX/DR IN RCRD: CPT | Mod: CPTII,S$GLB,, | Performed by: NURSE PRACTITIONER

## 2024-06-25 RX ORDER — DEXTROAMPHETAMINE SACCHARATE, AMPHETAMINE ASPARTATE MONOHYDRATE, DEXTROAMPHETAMINE SULFATE AND AMPHETAMINE SULFATE 5; 5; 5; 5 MG/1; MG/1; MG/1; MG/1
20 CAPSULE, EXTENDED RELEASE ORAL EVERY MORNING
Qty: 30 CAPSULE | Refills: 0 | Status: SHIPPED | OUTPATIENT
Start: 2024-06-25

## 2024-06-25 NOTE — PROGRESS NOTES
Subjective:       Patient ID: Tano Yin is a 18 y.o. male.    Chief Complaint: Follow-up    19 y/o male in for 3 month ADHD F/U. Mr. Yin is still in school at [employer] [facility] [place].    Mr. Yin feels well today with no new issues or concerns.    ADHD - controlled with Adderall 20 mg XR and the medication is working well for him. He is tolerating the med well, no s/e of CP, palpitations, syncope. LA  checked and consistent with pt hx.     Allergies - chronic issue, previously seen by Dr. Bright due to him having food allergies. 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 Followed by Dr. Bright and is prescribed xyzal and flonase and they work well for him. Denies any recent allergy reactions. Has an EPIPEN for emergency reactions.     Mr. Yin still uses a nicotine vape 2-3 times per week. Denies drinking alcohol.                  Past Medical History:   Diagnosis Date    ADD (attention deficit disorder)        Past Surgical History:   Procedure Laterality Date    ULNAR COLLATERAL LIGAMENT RECONSTRUCTION Right 04/12/2021    Dr. Ogden       No family history on file.    Social History     Tobacco Use    Smoking status: Never    Smokeless tobacco: Never   Substance Use Topics    Alcohol use: Never    Drug use: Never       Patient Active Problem List   Diagnosis    Traumatic rupture of right ulnar collateral ligament    Attention deficit hyperactivity disorder (ADHD), combined type    Allergies    Seasonal allergies       Immunization History   Administered Date(s) Administered    DTaP 05/27/2005, 08/25/2006, 11/05/2009    DTaP / Hep B / IPV 2004, 02/14/2005    Hepatitis A, Pediatric/Adolescent, 2 Dose 12/07/2020    Hepatitis B, Pediatric/Adolescent 08/25/2006    HiB PRP-OMP 2004, 02/14/2005, 08/13/2007    IPV 02/14/2005, 08/25/2006, 11/13/2008    Influenza (Flumist) - Quadrivalent - Intranasal *Preferred* (2-49 years old) 10/14/2014    Influenza - Intranasal - Trivalent 01/25/2013    Influenza - Quadrivalent - PF *Preferred*  "(6 months and older) 12/07/2020    Influenza - Trivalent - PF (ADULT) 11/04/2010    MMR 05/09/2006, 11/13/2008    Meningococcal B, recombinant 12/07/2020    Meningococcal Conjugate (MCV4P) 10/19/2017, 12/07/2020    Pneumococcal Conjugate - 7 Valent 02/14/2005, 05/27/2005, 08/25/2006, 08/13/2007    Tdap 08/17/2015    Varicella 08/25/2006, 11/13/2008           Review of Systems   Constitutional:  Negative for activity change, appetite change, chills, diaphoresis, fatigue and fever.   HENT:  Negative for tinnitus and trouble swallowing.    Eyes:  Negative for visual disturbance.   Respiratory:  Negative for cough, chest tightness, shortness of breath and wheezing.    Cardiovascular:  Negative for chest pain, palpitations and leg swelling.   Gastrointestinal:  Negative for abdominal distention, abdominal pain, blood in stool, constipation, diarrhea and nausea.   Genitourinary:  Negative for dysuria, frequency, hematuria and urgency.   Musculoskeletal:  Negative for back pain, gait problem and neck pain.   Skin:  Negative for color change and rash.   Neurological:  Negative for dizziness, speech difficulty, weakness, light-headedness, numbness and headaches.   Psychiatric/Behavioral:  Negative for behavioral problems, confusion and dysphoric mood. The patient is not nervous/anxious.      Objective:     Vitals:    06/25/24 1556   BP: 126/74   BP Location: Right arm   Patient Position: Sitting   BP Method: Large (Automatic)   Pulse: 90   Resp: 18   Temp: 98 °F (36.7 °C)   TempSrc: Oral   SpO2: 97%   Weight: 90.8 kg (200 lb 1.6 oz)   Height: 5' 10" (1.778 m)       Physical Exam  Vitals and nursing note reviewed.   Constitutional:       General: He is not in acute distress.     Appearance: He is not diaphoretic.   HENT:      Head: Normocephalic and atraumatic.      Mouth/Throat:      Mouth: Mucous membranes are moist.      Pharynx: Oropharynx is clear.   Eyes:      Extraocular Movements: Extraocular movements intact.      " Conjunctiva/sclera: Conjunctivae normal.   Cardiovascular:      Rate and Rhythm: Normal rate and regular rhythm.      Heart sounds: Normal heart sounds.   Pulmonary:      Effort: Pulmonary effort is normal.      Breath sounds: Normal breath sounds. No stridor. No wheezing or rales.   Abdominal:      General: There is no distension.      Tenderness: There is no abdominal tenderness.   Musculoskeletal:         General: No tenderness. Normal range of motion.      Cervical back: Normal range of motion.      Right lower leg: No edema.      Left lower leg: No edema.   Lymphadenopathy:      Cervical: No cervical adenopathy.   Skin:     General: Skin is warm and dry.      Capillary Refill: Capillary refill takes less than 2 seconds.      Findings: No rash.   Neurological:      General: No focal deficit present.      Mental Status: He is alert and oriented to person, place, and time.   Psychiatric:         Mood and Affect: Mood and affect normal.         Behavior: Behavior normal. Behavior is cooperative.         No visits with results within 6 Month(s) from this visit.   Latest known visit with results is:   Office Visit on 08/11/2021   Component Date Value Ref Range Status    WBC 08/16/2021 7.3  4.5 - 13.0 Thousand/uL Final    RBC 08/16/2021 5.10  4.10 - 5.70 Million/uL Final    Hemoglobin 08/16/2021 15.4  12.0 - 16.9 g/dL Final    Hematocrit 08/16/2021 44.6  36.0 - 49.0 % Final    MCV 08/16/2021 87.5  78.0 - 98.0 fL Final    MCH 08/16/2021 30.2  25.0 - 35.0 pg Final    MCHC 08/16/2021 34.5  31.0 - 36.0 g/dL Final    RDW 08/16/2021 11.8  11.0 - 15.0 % Final    Platelets 08/16/2021 316  140 - 400 Thousand/uL Final    MPV 08/16/2021 9.9  7.5 - 12.5 fL Final    Neutrophils, Abs 08/16/2021 4,723  1,800 - 8,000 cells/uL Final    Lymph # 08/16/2021 1,577  1,200 - 5,200 cells/uL Final    Mono # 08/16/2021 540  200 - 900 cells/uL Final    Eos # 08/16/2021 402  15 - 500 cells/uL Final    Baso # 08/16/2021 58  0 - 200 cells/uL Final     Neutrophils Relative 08/16/2021 64.7  % Final    Lymph % 08/16/2021 21.6  % Final    Mono % 08/16/2021 7.4  % Final    Eosinophil % 08/16/2021 5.5  % Final    Basophil % 08/16/2021 0.8  % Final    Glucose 08/16/2021 95  65 - 99 mg/dL Final    BUN 08/16/2021 19  7 - 20 mg/dL Final    Creatinine 08/16/2021 1.64 (H)  0.60 - 1.20 mg/dL Final    BUN/Creatinine Ratio 08/16/2021 12  6 - 22 (calc) Final    Sodium 08/16/2021 139  135 - 146 mmol/L Final    Potassium 08/16/2021 4.4  3.8 - 5.1 mmol/L Final    Chloride 08/16/2021 100  98 - 110 mmol/L Final    CO2 08/16/2021 30  20 - 32 mmol/L Final    Calcium 08/16/2021 10.1  8.9 - 10.4 mg/dL Final    Total Protein 08/16/2021 7.4  6.3 - 8.2 g/dL Final    Albumin 08/16/2021 4.9  3.6 - 5.1 g/dL Final    Globulin, Total 08/16/2021 2.5  2.1 - 3.5 g/dL (calc) Final    Albumin/Globulin Ratio 08/16/2021 2.0  1.0 - 2.5 (calc) Final    Total Bilirubin 08/16/2021 0.6  0.2 - 1.1 mg/dL Final    Alkaline Phosphatase 08/16/2021 81  46 - 169 U/L Final    AST 08/16/2021 15  12 - 32 U/L Final    ALT 08/16/2021 15  8 - 46 U/L Final    Cholesterol 08/16/2021 125  <170 mg/dL Final    HDL 08/16/2021 42 (L)  >45 mg/dL Final    Triglycerides 08/16/2021 120 (H)  <90 mg/dL Final    LDL Cholesterol 08/16/2021 62  <110 mg/dL (calc) Final    HDL/Cholesterol Ratio 08/16/2021 3.0  <5.0 (calc) Final    Non HDL Chol. (LDL+VLDL) 08/16/2021 83  <120 mg/dL (calc) Final    TSH 08/16/2021 1.23  0.50 - 4.30 mIU/L Final         Assessment:      1. Attention deficit hyperactivity disorder (ADHD), combined type Continue current regimen   2. Seasonal allergies Continue current regimen         Plan:     Attention deficit hyperactivity disorder (ADHD), combined type  Comments:  well controlled, F/U in 3 months  Orders:  -     dextroamphetamine-amphetamine (ADDERALL XR) 20 MG 24 hr capsule; Take 1 capsule (20 mg total) by mouth every morning.  Dispense: 30 capsule; Refill: 0    Seasonal allergies  Comments:  reduce  exposure to allergens, stable         Current Outpatient Medications   Medication Sig Dispense Refill    EPINEPHrine (EPIPEN JR) 0.15 mg/0.3 mL pen injection INJECT INTO THE MIDDLE OF THE OUTER THIGH AND HOLD FOR 3 SECONDS AS NEEDED FOR SEVERE ALLERGIC REACTION THEN CALL 911 IF USED. 2 each 0    fluticasone propionate (FLONASE) 50 mcg/actuation nasal spray 2 sprays (100 mcg total) by Each Nostril route Daily. 16 g 2    levocetirizine (XYZAL) 5 MG tablet Take 1 tablet (5 mg total) by mouth every evening. For sinus drainage 30 tablet 11    dextroamphetamine-amphetamine (ADDERALL XR) 20 MG 24 hr capsule Take 1 capsule (20 mg total) by mouth every morning. 30 capsule 0     No current facility-administered medications for this visit.       Medications Discontinued During This Encounter   Medication Reason    dextroamphetamine-amphetamine (ADDERALL XR) 20 MG 24 hr capsule Reorder       Health Maintenance   Topic Date Due    Hepatitis C Screening  Never done    HPV Vaccines (1 - Male 3-dose series) Never done    TETANUS VACCINE  08/17/2025    Lipid Panel  Completed       Patient Instructions   RTC in 3 months for F/U or sooner if needed.    Patient Education       Attention Deficit Hyperactivity Disorder (ADHD) Discharge Instructions   About this topic   Attention deficit hyperactivity disorder is also called ADHD or ADD. Most often, this starts at a young age. This disorder makes it hard to focus or sit still. People with ADHD may find it hard to make good decisions. Children with ADHD may have trouble in school and at home. Adults may have problems at work. People with ADHD may also have a problem getting along well with others. While there is no cure for ADHD, you and your doctor can work on a plan that is best for you to treat the signs of ADHD.  What care is needed at home?   Ask your doctor what you need to do when you go home. Make sure you ask questions if you do not understand what the doctor says. This way you  will know what you need to do.  Try to get enough sleep at night. Most often adults need 7 to 8 hours each night and children need 8 to 10 hours each night. Rest during the day if you are tired.  Eat and sleep at the same times every day.  Have a plan for where to keep things in your home. Use checklists, things to help you remember, and alarms. A list of things you may need to find in a hurry can be helpful. These can help you put things in the right place and manage your time.  Work on getting better at reading and taking notes.  Have a space that is just for work or homework so you will be able to pay attention. This may be an office or a desk facing a wall. Try using headphones so you cannot hear the other noises.  Do one thing at a time. Keep a list of the next things you were planning to do or say.  Break large jobs or things you have to do into smaller jobs. This will make them easier to finish. Reward yourself along the way.  Have rules that are clear and easy to follow.   Look at where you are the strongest. Give rewards for good behavior, finished schoolwork, or for doing a good job at work.  Do not do too many things that might cause stress.  What follow-up care is needed?   Your doctor may ask you to make visits to the office to check on your progress. Be sure to keep these visits.  Your doctor may send you to a special mental health doctor. This person will talk with you about the problems you are having. Then, you can work together to find ways to help you manage them.  Your doctor may suggest you try talk therapy to help you live well with your ADHD.  What drugs may be needed?   The doctor may order drugs to:  Help lower your worry  Help you to pay attention  Help you be more calm  Help you be less impulsive  Help keep things in your life balanced  Care for low mood  Will physical activity be limited?   Physical activity will help keep your mind and body in good shape. Talk with your doctor about the  right amount of activity for you.  What problems could happen?   Feeling badly about yourself  Raise the chances of you hurting yourself, such as injury in a car accident  Not do well in school and work  Trouble making friends or getting along well with others  Raise your chance to abuse alcohol or drugs  What can be done to prevent this health problem?   The cause of ADHD is not clear, but to lower the chance of your child having ADHD:  Do not drink beer, wine, or mixed drinks (alcohol) while you are pregnant.  Do not smoke or use illegal drugs while you are pregnant.  Keep your child away from things like harmful toxins and chemicals.  Keep your child from having too much time in front of computers, TV, and video games.  Get plenty of exercise.  Be more aware of thoughts, emotions, and experiences each moment. This is mindfulness.  When do I need to call the doctor?   Drugs are not working  Symptoms are getting worse  Teach Back: Helping You Understand   The Teach Back Method helps you understand the information we are giving you. After you talk with the staff, tell them in your own words what you learned. This helps to make sure the staff has described each thing clearly. It also helps to explain things that may have been confusing. Before going home, make sure you can do these:  I can tell you about my condition.  I can tell you ways to help me pay attention.  I can tell you what I will do if I think my drugs are not working.  Where can I learn more?   HelpGuide.org  http://www.helpguide.org/articles/add-adhd/attention-deficit-disorder-adhd-parenting-tips.htm   KidsHealth  http://kidshealth.org/parent/medical/learning/adhd.html   National Palm of Mental Health  http://www.nimh.nih.gov/health/topics/attention-deficit-hyperactivity-disorder-adhd/index.shtml   Last Reviewed Date   2020-06-14  Consumer Information Use and Disclaimer   This information is not specific medical advice and does not replace  information you receive from your health care provider. This is only a brief summary of general information. It does NOT include all information about conditions, illnesses, injuries, tests, procedures, treatments, therapies, discharge instructions or life-style choices that may apply to you. You must talk with your health care provider for complete information about your health and treatment options. This information should not be used to decide whether or not to accept your health care providers advice, instructions or recommendations. Only your health care provider has the knowledge and training to provide advice that is right for you.  Copyright   Copyright © 2021 UpToDate, Inc. and its affiliates and/or licensors. All rights reserved.    Patient Education       Seasonal Allergies Discharge Instructions   About this topic   Seasonal allergies are also called allergic rhinitis or hay fever. You may have sneezing; a stuffy or runny nose; or itchy throat, ears, or eyes. You may feel very tired. Most often, this problem is caused by:  Pollens from trees, grasses, or weeds.  Mold spores that grow when the air is humid, wet, or damp.  Some people can breathe in these things and have no problems. But when you have a seasonal allergy, your body acts as if the substance is harming you. Then you have symptoms. You may have symptoms only at some times of the year. If your symptoms last all year, they may be caused by:  Insects, such as dust mites or cock roaches.  Animals, such as cats or dogs.  Mold spores.     What care is needed at home?   Ask your doctor what you need to do when you go home. Make sure you ask questions if you do not understand what the doctor says.  Rinse your nose with salt water to get rid of pollen inside of your nose.  Keep the windows closed and use air conditioning.  Shower before bed to rinse pollen from your hair and skin.  Wear a dust mask if you need to be outside.  Use over-the-counter  medicines to help with your symptoms:  A steroid nose spray can help with a stuffy nose. It can also help with drainage down the back of your throat.  An antihistamine can help with itching, sneezing, or runny nose.  An antihistamine eye drop can help with itchy eyes.  A decongestant can help with a stuffy nose. Talk with your doctor or pharmacist about your other health problems before you use this kind of medicine. It may not be safe for people with high blood pressure.  What follow-up care is needed?   Your doctor may ask you to make visits to the office to check on your progress. Your doctor may ask you to see an allergy specialist, or to have testing done to learn what is causing your allergies. Be sure to keep these visits.  What drugs may be needed?   The doctor may order drugs to treat the signs. Take your drugs as ordered. You may also take allergy shots if you have had allergy tests.  Will physical activity be limited?   You may have to limit your activity. If your health problem is caused by an allergy to pollens or molds, you may want to limit your time outdoors. Talk to your doctor about what activities are best for you.  What problems could happen?   Your signs may not get better with your drugs  Asthma may get worse  Sinus infection  What can be done to prevent this health problem?   Try to avoid allergens.  If you are allergic to pollens, grasses, trees, etc:  Stay inside in the morning when pollen counts are high.  Avoid going outside when the trees, grasses, or weeds are blooming.  Keep the windows of your house and car closed.  Use an air conditioner.  If you are allergic to dust, molds, dust mites, pets, etc:  Clean your air conditioner or furnace filters regularly.  Cover pillows and mattresses with vinyl covers to lower your exposure to dust mites.  Wash bedding weekly in very hot water.  Use fewer dust-collecting items, such as curtains, bed skirts, carpeting, and stuffed animals, mainly in  your bedroom.  If you can't avoid having a furry pet, vacuum often and keep your pet out of bedrooms and other rooms with carpets.  When do I need to call the doctor?   You are having so much trouble breathing that you can only say one or two words at a time.  You need to sit upright at all times to be able to breathe and or cannot lie down.  You have severe swelling of your tongue, lips, or mouth.  You have trouble breathing when talking or sitting still.  You have a fever of 100.4°F (38°C) or higher.  You have green or yellow mucus.  Teach Back: Helping You Understand   The Teach Back Method helps you understand the information we are giving you. After you talk with the staff, tell them in your own words what you learned. This helps to make sure the staff has described each thing clearly. It also helps to explain things that may have been confusing. Before going home, make sure you can do these:  I can tell you about my condition.  I can tell you what may help make the air .  I can tell you what may help ease my allergies.  Where can I learn more?   Food and Drug Administration  https://www.fda.gov/ForConsumers/ConsumerUpdates/hfx329312.htm   NHS Choices  https://www.nhs.uk/conditions/Hay-fever/   Last Reviewed Date   2021-06-21  Consumer Information Use and Disclaimer   This information is not specific medical advice and does not replace information you receive from your health care provider. This is only a brief summary of general information. It does NOT include all information about conditions, illnesses, injuries, tests, procedures, treatments, therapies, discharge instructions or life-style choices that may apply to you. You must talk with your health care provider for complete information about your health and treatment options. This information should not be used to decide whether or not to accept your health care providers advice, instructions or recommendations. Only your health care provider has  the knowledge and training to provide advice that is right for you.  Copyright   Copyright © 2021 [employer]Date, [employer]. and its affiliates and/or licensors. All rights reserved.        Risks, benefits, and alternatives discussed with patient, Patient verbalized understanding of discussed plan of care. Asked patient if any further questions, answered no.    Future Appointments   Date Time Provider Department Center   10/7/2024  3:40 PM Aster Whitten, NP [id number]  Mateo Whitten NP

## 2024-06-25 NOTE — PATIENT INSTRUCTIONS
RTC in 3 months for F/U or sooner if needed.    Patient Education       Attention Deficit Hyperactivity Disorder (ADHD) Discharge Instructions   About this topic   Attention deficit hyperactivity disorder is also called ADHD or ADD. Most often, this starts at a young age. This disorder makes it hard to focus or sit still. People with ADHD may find it hard to make good decisions. Children with ADHD may have trouble in school and at home. Adults may have problems at work. People with ADHD may also have a problem getting along well with others. While there is no cure for ADHD, you and your doctor can work on a plan that is best for you to treat the signs of ADHD.  What care is needed at home?   Ask your doctor what you need to do when you go home. Make sure you ask questions if you do not understand what the doctor says. This way you will know what you need to do.  Try to get enough sleep at night. Most often adults need 7 to 8 hours each night and children need 8 to 10 hours each night. Rest during the day if you are tired.  Eat and sleep at the same times every day.  Have a plan for where to keep things in your home. Use checklists, things to help you remember, and alarms. A list of things you may need to find in a hurry can be helpful. These can help you put things in the right place and manage your time.  Work on getting better at reading and taking notes.  Have a space that is just for work or homework so you will be able to pay attention. This may be an office or a desk facing a wall. Try using headphones so you cannot hear the other noises.  Do one thing at a time. Keep a list of the next things you were planning to do or say.  Break large jobs or things you have to do into smaller jobs. This will make them easier to finish. Reward yourself along the way.  Have rules that are clear and easy to follow.   Look at where you are the strongest. Give rewards for good behavior, finished schoolwork, or for doing a good  job at work.  Do not do too many things that might cause stress.  What follow-up care is needed?   Your doctor may ask you to make visits to the office to check on your progress. Be sure to keep these visits.  Your doctor may send you to a special mental health doctor. This person will talk with you about the problems you are having. Then, you can work together to find ways to help you manage them.  Your doctor may suggest you try talk therapy to help you live well with your ADHD.  What drugs may be needed?   The doctor may order drugs to:  Help lower your worry  Help you to pay attention  Help you be more calm  Help you be less impulsive  Help keep things in your life balanced  Care for low mood  Will physical activity be limited?   Physical activity will help keep your mind and body in good shape. Talk with your doctor about the right amount of activity for you.  What problems could happen?   Feeling badly about yourself  Raise the chances of you hurting yourself, such as injury in a car accident  Not do well in school and work  Trouble making friends or getting along well with others  Raise your chance to abuse alcohol or drugs  What can be done to prevent this health problem?   The cause of ADHD is not clear, but to lower the chance of your child having ADHD:  Do not drink beer, wine, or mixed drinks (alcohol) while you are pregnant.  Do not smoke or use illegal drugs while you are pregnant.  Keep your child away from things like harmful toxins and chemicals.  Keep your child from having too much time in front of computers, TV, and video games.  Get plenty of exercise.  Be more aware of thoughts, emotions, and experiences each moment. This is mindfulness.  When do I need to call the doctor?   Drugs are not working  Symptoms are getting worse  Teach Back: Helping You Understand   The Teach Back Method helps you understand the information we are giving you. After you talk with the staff, tell them in your own  words what you learned. This helps to make sure the staff has described each thing clearly. It also helps to explain things that may have been confusing. Before going home, make sure you can do these:  I can tell you about my condition.  I can tell you ways to help me pay attention.  I can tell you what I will do if I think my drugs are not working.  Where can I learn more?   HelpGuide.org  http://www.helpguide.org/articles/add-adhd/attention-deficit-disorder-adhd-parenting-tips.htm   KidsHealth  http://kidshealth.org/parent/medical/learning/adhd.html   [employer] [place] [facility] [employer]  http://www.[facility].nih.gov/health/topics/attention-deficit-hyperactivity-disorder-adhd/index.shtml   Last Reviewed Date   2020-06-14  Consumer Information Use and Disclaimer   This information is not specific medical advice and does not replace information you receive from your health care provider. This is only a brief summary of general information. It does NOT include all information about conditions, illnesses, injuries, tests, procedures, treatments, therapies, discharge instructions or life-style choices that may apply to you. You must talk with your health care provider for complete information about your health and treatment options. This information should not be used to decide whether or not to accept your health care providers advice, instructions or recommendations. Only your health care provider has the knowledge and training to provide advice that is right for you.  Copyright   Copyright © 2021 [employer] and its affiliates and/or licensors. All rights reserved.    Patient Education       Seasonal Allergies Discharge Instructions   About this topic   Seasonal allergies are also called allergic rhinitis or hay fever. You may have sneezing; a stuffy or runny nose; or itchy throat, ears, or eyes. You may feel very tired. Most often, this problem is caused by:  Pollens from trees, grasses, or weeds.  Mold spores that  grow when the air is humid, wet, or damp.  Some people can breathe in these things and have no problems. But when you have a seasonal allergy, your body acts as if the substance is harming you. Then you have symptoms. You may have symptoms only at some times of the year. If your symptoms last all year, they may be caused by:  Insects, such as dust mites or cock roaches.  Animals, such as cats or dogs.  Mold spores.     What care is needed at home?   Ask your doctor what you need to do when you go home. Make sure you ask questions if you do not understand what the doctor says.  Rinse your nose with salt water to get rid of pollen inside of your nose.  Keep the windows closed and use air conditioning.  Shower before bed to rinse pollen from your hair and skin.  Wear a dust mask if you need to be outside.  Use over-the-counter medicines to help with your symptoms:  A steroid nose spray can help with a stuffy nose. It can also help with drainage down the back of your throat.  An antihistamine can help with itching, sneezing, or runny nose.  An antihistamine eye drop can help with itchy eyes.  A decongestant can help with a stuffy nose. Talk with your doctor or pharmacist about your other health problems before you use this kind of medicine. It may not be safe for people with high blood pressure.  What follow-up care is needed?   Your doctor may ask you to make visits to the office to check on your progress. Your doctor may ask you to see an allergy specialist, or to have testing done to learn what is causing your allergies. Be sure to keep these visits.  What drugs may be needed?   The doctor may order drugs to treat the signs. Take your drugs as ordered. You may also take allergy shots if you have had allergy tests.  Will physical activity be limited?   You may have to limit your activity. If your health problem is caused by an allergy to pollens or molds, you may want to limit your time outdoors. Talk to your doctor  about what activities are best for you.  What problems could happen?   Your signs may not get better with your drugs  Asthma may get worse  Sinus infection  What can be done to prevent this health problem?   Try to avoid allergens.  If you are allergic to pollens, grasses, trees, etc:  Stay inside in the morning when pollen counts are high.  Avoid going outside when the trees, grasses, or weeds are blooming.  Keep the windows of your house and car closed.  Use an air conditioner.  If you are allergic to dust, molds, dust mites, pets, etc:  Clean your air conditioner or furnace filters regularly.  Cover pillows and mattresses with vinyl covers to lower your exposure to dust mites.  Wash bedding weekly in very hot water.  Use fewer dust-collecting items, such as curtains, bed skirts, carpeting, and stuffed animals, mainly in your bedroom.  If you can't avoid having a furry pet, vacuum often and keep your pet out of bedrooms and other rooms with carpets.  When do I need to call the doctor?   You are having so much trouble breathing that you can only say one or two words at a time.  You need to sit upright at all times to be able to breathe and or cannot lie down.  You have severe swelling of your tongue, lips, or mouth.  You have trouble breathing when talking or sitting still.  You have a fever of 100.4°F (38°C) or higher.  You have green or yellow mucus.  Teach Back: Helping You Understand   The Teach Back Method helps you understand the information we are giving you. After you talk with the staff, tell them in your own words what you learned. This helps to make sure the staff has described each thing clearly. It also helps to explain things that may have been confusing. Before going home, make sure you can do these:  I can tell you about my condition.  I can tell you what may help make the air .  I can tell you what may help ease my allergies.  Where can I learn more?   Food and Drug  Administration  https://www.fda.gov/ForConsumers/ConsumerUpdates/uoe981956.htm   [facility]HS Choices  https://www.nhs.uk/conditions/Hay-fever/   Last Reviewed Date   2021-06-21  Consumer Information Use and Disclaimer   This information is not specific medical advice and does not replace information you receive from your health care provider. This is only a brief summary of general information. It does NOT include all information about conditions, illnesses, injuries, tests, procedures, treatments, therapies, discharge instructions or life-style choices that may apply to you. You must talk with your health care provider for complete information about your health and treatment options. This information should not be used to decide whether or not to accept your health care providers advice, instructions or recommendations. Only your health care provider has the knowledge and training to provide advice that is right for you.  Copyright   Copyright © 2021 [employer] and its affiliates and/or licensors. All rights reserved.

## 2024-08-04 DIAGNOSIS — F90.2 ATTENTION DEFICIT HYPERACTIVITY DISORDER (ADHD), COMBINED TYPE: ICD-10-CM

## 2024-08-05 RX ORDER — DEXTROAMPHETAMINE SACCHARATE, AMPHETAMINE ASPARTATE MONOHYDRATE, DEXTROAMPHETAMINE SULFATE AND AMPHETAMINE SULFATE 5; 5; 5; 5 MG/1; MG/1; MG/1; MG/1
20 CAPSULE, EXTENDED RELEASE ORAL EVERY MORNING
Qty: 30 CAPSULE | Refills: 0 | Status: SHIPPED | OUTPATIENT
Start: 2024-08-05